# Patient Record
Sex: MALE | Race: WHITE | Employment: FULL TIME | URBAN - METROPOLITAN AREA
[De-identification: names, ages, dates, MRNs, and addresses within clinical notes are randomized per-mention and may not be internally consistent; named-entity substitution may affect disease eponyms.]

---

## 2019-03-22 ENCOUNTER — TELEPHONE (OUTPATIENT)
Dept: FAMILY MEDICINE | Facility: CLINIC | Age: 29
End: 2019-03-22

## 2019-03-22 NOTE — TELEPHONE ENCOUNTER
Pt call and state that he was not aware he had scheduled for today because he spoke to one the nurse and someone told him that they don't have that specific vaccin in the clinic, so he is planning to get it somewhere but for one of significant one he will check with his  insurance and call us back to make joi.  If you have any question pls call pt @    [phone] okay to leave a detailed message

## 2019-03-22 NOTE — TELEPHONE ENCOUNTER
3/22/2019    Call Regarding Onboarding: [facility]    Attempt 3    Message left with patient     Comments: PT ON BOARDED      Outreach

## 2019-03-22 NOTE — TELEPHONE ENCOUNTER
Left message for pt to call back. Pt was schedule today at 1:00 pm with dr Cadet and didn't show up. Please schedule an ov         Reyna Darden MA

## 2019-03-22 NOTE — TELEPHONE ENCOUNTER
Non detailed message left for pt to return call to clinic and ask to speak with a triage nurse.    Debbie CHENG RN  EP Triage

## 2019-03-25 NOTE — TELEPHONE ENCOUNTER
Left non detailed message for patient to return call. 3rd attempt. Will close note and reopen if patient calls.   Milagros Tolentino RN   [facility] - Triage

## 2019-03-25 NOTE — TELEPHONE ENCOUNTER
S/w pt who states he needed a typhoid vaccine and did not need to schedule an appt with primary care.  States when he called in and explained what he needed the person sounded confused and told pt she could not tell him if the clinic had that vaccine and transferred him to the clinic.  When he was transferred was told he needed to be seen at [facility] and transferred to the travel clinic.  appt was not canceled for Friday 3/22 and pt did not know he had an appt.  Pt is not looking for a  at this time.  Pt does have appt with [facility].    Debbie CHENG RN  EP Triage

## 2021-02-23 ENCOUNTER — HOSPITAL ENCOUNTER (EMERGENCY)
Facility: CLINIC | Age: 31
Discharge: HOME | End: 2021-02-23
Attending: EMERGENCY MEDICINE | Admitting: EMERGENCY MEDICINE
Payer: COMMERCIAL

## 2021-02-23 VITALS
HEART RATE: 74 BPM | SYSTOLIC BLOOD PRESSURE: 141 MMHG | TEMPERATURE: 97.9 F | DIASTOLIC BLOOD PRESSURE: 101 MMHG | RESPIRATION RATE: 16 BRPM | OXYGEN SATURATION: 99 %

## 2021-02-23 DIAGNOSIS — R45.851 SUICIDAL IDEATION: ICD-10-CM

## 2021-02-23 DIAGNOSIS — F41.1 GAD (GENERALIZED ANXIETY DISORDER): ICD-10-CM

## 2021-02-23 PROCEDURE — 90791 PSYCH DIAGNOSTIC EVALUATION: CPT

## 2021-02-23 PROCEDURE — 99285 EMERGENCY DEPT VISIT HI MDM: CPT | Mod: 25

## 2021-02-23 RX ORDER — HYDROXYZINE HYDROCHLORIDE 25 MG/1
25-50 TABLET, FILM COATED ORAL 3 TIMES DAILY PRN
Qty: 30 TABLET | Refills: 0 | Status: SHIPPED | OUTPATIENT
Start: 2021-02-23

## 2021-02-23 ASSESSMENT — ENCOUNTER SYMPTOMS
VOMITING: 0
FEVER: 0
NERVOUS/ANXIOUS: 1
DYSPHORIC MOOD: 1

## 2021-02-24 NOTE — DISCHARGE INSTRUCTIONS
Discharge Instructions  Mental Health Concerns    You were seen today for mental health concerns, such as depression, anxiety, or suicidal thinking. Your provider feels that you do not require hospitalization at this time. However, your symptoms may become worse, and you may need to return to the Emergency Department. Most treatments of depression and suicidal thoughts are a process rather than a single intervention.  Medications and counseling can take several weeks or more to help.    Generally, every Emergency Department visit should have a follow-up clinic visit with either a primary or a specialty clinic/provider. Please follow-up as instructed by your emergency provider today.    By accepting these discharge instructions:  You promise to not harm yourself or others.  You agree that if you feel you are becoming unable to keep that promise, you will do something to help yourself before you do anything to harm yourself or others.   You agree to keep any safety plan arranged on your visit here today.  You agree to take any medication prescribed or recommended by your provider.  If you are getting worse, you can contact a friend or a family member, contact your counselor or family provider, contact a crisis line, or other options discussed with the provider or therapist today.  At any time, you can call 911 and return to the Emergency Department for more help.  You understand that follow-up is essential to your treatment, and you will make and keep appointments recommended on your visit today.    How to improve your mental health and prevent suicide:  Involve others by letting family, friends, counselors know.  Do not isolate yourself.  Avoid alcohol or drugs. Remove weapons, poisons from your home.  Try to stick to routines for eating, sleeping and getting regular exercise.    Try to get into sunlight. Bright natural light not only treats seasonal affective disorder but also depression.  Increase safe activities  that you enjoy.    If you feel worse, contact 1-800-suicide (1-952.212.6909), or call 911, or your primary provider/counselor for additional assistance.    If you were given a prescription for medicine here today, be sure to read all of the information (including the package insert) that comes with your prescription.  This will include important information about the medicine, its side effects, and any warnings that you need to know about.  The pharmacist who fills the prescription can provide more information and answer questions you may have about the medicine.  If you have questions or concerns that the pharmacist cannot address, please call or return to the Emergency Department.   Remember that you can always come back to the Emergency Department if you are not able to see your regular provider in the amount of time listed above, if you get any new symptoms, or if there is anything that worries you.

## 2021-02-24 NOTE — ED TRIAGE NOTES
Pt was speaking to his therapist about his suicidal ideations with no plan on how and was advised by his therapist to go to the ED for immediate evaluation rather than wait for an appointment at a later date. Pt is very calm and cooperative.

## 2021-02-24 NOTE — ED PROVIDER NOTES
History   Chief Complaint:  Depressed      HPI   Jett Henderson is a 30 year old male with history of hypertension who presents with dysphoria that he discussed with his therapist earlier today. The patient reports that past month or so he has been feeling more depressed. He does not recall any specific triggers for these feelings, but does note that it has gradually worsened. The patient does have a therapist and has been speaking with them for the last 2 months. He spoke with his therapist today and mentioned his depression and episodes of anxiety. He was told that he should come to the emergency department for evaluation. The patein states that he feels anxious almost everyday, but does not feel that there is a trigger for his anxiety. He does not take any medications and has an appointment with a psychiatrist in 3 days. The patient otherwise mentions that he has a history of elevated blood pressures, but does not know if this is related. He denies any current suicidal ideation, fevers or vomiting.       Review of Systems   Constitutional: Negative for fever.   Gastrointestinal: Negative for vomiting.   Psychiatric/Behavioral: Positive for dysphoric mood. Negative for suicidal ideas. The patient is nervous/anxious.    All other systems reviewed and are negative.      Allergies:  The patient has no known allergies.     Medications:  The patient is not currently taking any prescribed medications.    Past Medical History:    Hypertension    Past Surgical History:    [place] teeth extraction  Myringotomy with tube placement     Family History:    Mother: thyroid disease   He denies any family history of hypertension.     Social History:  The patient presents to the emergency department alone.     Physical Exam     Patient Vitals for the past 24 hrs:   BP Temp Temp src Pulse Resp SpO2   02/23/21 1926 (!) 147/106 97.9  F (36.6  C) Oral 76 18 97 %       Physical Exam  Eye:  Pupils are equal, round, and reactive.   Extraocular movements intact.    ENT:  No rhinorrhea.  Moist mucus membranes.  Normal tongue and tonsil.    Cardiac:  Regular rate and rhythm.  No murmurs, gallops, or rubs.    Pulmonary:  Clear to auscultation bilaterally.  No wheezes, rales, or rhonchi.    Abdomen:  Positive bowel sounds.  Abdomen is soft and non-distended, without focal tenderness.    Musculoskeletal:  Normal movement of all extremities without evidence for deficit.    Skin:  Warm and dry without rashes.    Neurologic:  Non-focal exam without asymmetric weakness or numbness.     Psychiatric:  Patient admits to anxiety and depression, but denies suicidal thoughts. Well groomed with good eye-contact.       Emergency Department Course     Emergency Department Course:    Reviewed:  I reviewed nursing notes, vitals, past medical history and care everywhere    Assessments:  2123 I went to see the patient, but DEC was evaluating the patient.   2155 I obtained history and examined the patient as noted above.     Consults:   2123 I spoke with DEC regarding the patient, following their evaluation.     Disposition:  The patient was discharged to home.       Impression & Plan   Medical Decision Making:  This delightful 30-year-old man with a history of anxiety and depression presents to us at advice of his therapist for evaluation.  The patient has been seeing his therapist for the last 2 months, but has found that his depression seems to be slightly worse with his chronic suicidal thoughts continuing to be more intrusive.  However, he states that he has control of these and does not feel he is unsafe at home at this time.  He is requesting something to help with his spells of anxiety.  His physical exam is reassuring.  He was evaluated by our DEC team who feels that he is safe for discharge home.  He has been set up with a visit with a psychiatrist on Friday.  I will prescribe him hydroxyzine for breakthrough anxiety and he will otherwise return to us for  any thoughts of self-harm or other emergent concerns.      Diagnosis:    ICD-10-CM    1. Suicidal ideation  R45.851    2. GAVIN (generalized anxiety disorder)  F41.1        Discharge Medications:  New Prescriptions    HYDROXYZINE (ATARAX) 25 MG TABLET    Take 1-2 tablets (25-50 mg) by mouth 3 times daily as needed for anxiety       Scribe Disclosure:  I, Lang Rgio, am serving as a scribe at 7:30 PM on 2/23/2021 to document services personally performed by Trierweiler, Chad A, MD based on my observations and the provider's statements to me.            Trierweiler, Chad A, MD  02/24/21 2118

## 2021-04-25 ENCOUNTER — HEALTH MAINTENANCE LETTER (OUTPATIENT)
Age: 31
End: 2021-04-25

## 2021-10-09 ENCOUNTER — HEALTH MAINTENANCE LETTER (OUTPATIENT)
Age: 31
End: 2021-10-09

## 2022-05-21 ENCOUNTER — HEALTH MAINTENANCE LETTER (OUTPATIENT)
Age: 32
End: 2022-05-21

## 2022-09-17 ENCOUNTER — HEALTH MAINTENANCE LETTER (OUTPATIENT)
Age: 32
End: 2022-09-17

## 2023-06-04 ENCOUNTER — HEALTH MAINTENANCE LETTER (OUTPATIENT)
Age: 33
End: 2023-06-04